# Patient Record
Sex: FEMALE | Race: WHITE | NOT HISPANIC OR LATINO | Employment: PART TIME | URBAN - METROPOLITAN AREA
[De-identification: names, ages, dates, MRNs, and addresses within clinical notes are randomized per-mention and may not be internally consistent; named-entity substitution may affect disease eponyms.]

---

## 2024-07-26 ENCOUNTER — APPOINTMENT (OUTPATIENT)
Dept: OBSTETRICS AND GYNECOLOGY | Facility: CLINIC | Age: 34
End: 2024-07-26
Payer: COMMERCIAL

## 2024-07-26 VITALS — DIASTOLIC BLOOD PRESSURE: 70 MMHG | SYSTOLIC BLOOD PRESSURE: 120 MMHG

## 2024-07-26 DIAGNOSIS — Z32.02 URINE PREGNANCY TEST NEGATIVE: ICD-10-CM

## 2024-07-26 DIAGNOSIS — Z11.3 SCREENING FOR STD (SEXUALLY TRANSMITTED DISEASE): ICD-10-CM

## 2024-07-26 DIAGNOSIS — M62.89 PELVIC FLOOR DYSFUNCTION: ICD-10-CM

## 2024-07-26 DIAGNOSIS — Z30.430 ENCOUNTER FOR IUD INSERTION: Primary | ICD-10-CM

## 2024-07-26 LAB — PREGNANCY TEST URINE, POC: NEGATIVE

## 2024-07-26 PROCEDURE — 1036F TOBACCO NON-USER: CPT | Performed by: NURSE PRACTITIONER

## 2024-07-26 PROCEDURE — 58300 INSERT INTRAUTERINE DEVICE: CPT | Performed by: NURSE PRACTITIONER

## 2024-07-26 PROCEDURE — 87491 CHLMYD TRACH DNA AMP PROBE: CPT

## 2024-07-26 PROCEDURE — 87591 N.GONORRHOEAE DNA AMP PROB: CPT

## 2024-07-26 PROCEDURE — 81025 URINE PREGNANCY TEST: CPT | Performed by: NURSE PRACTITIONER

## 2024-07-26 RX ORDER — LEVONORGESTREL 52 MG/1
1 INTRAUTERINE DEVICE INTRAUTERINE ONCE
COMMUNITY

## 2024-07-26 NOTE — PROGRESS NOTES
Subjective   Patient ID: Kati Jones is a 33 y.o. female who presents for Contraception (Desires IUD-/Reviewing  EMR/Last Annual Exam: 05/04/2023 with Carissa WALDRON CNM/Normal pap 2022 documented on Carissa's office visit note/- patient declined a chaperone-/).  HPI  Here for IUD insertion. Had a Mirena in the past and did well with this. She is s/p vaginal delivery 3 months ago. She had one normal period about 6 weeks postpartum. No periods since then. She is breastfeeding.   Was being seen at [facility] and planned to have her IUD and follow-up with pelvic floor PT, but her insurance changed and she needs to re-establish with . Would like to continue with her plan of IUD and PT. Will place referral order for PT.   Urine HCG is negative today.   Reviewed risks/ benefits of IUD as well as procedure for placement.   Consent signed electronically.     Review of Systems   All other systems reviewed and are negative.      Objective   Physical Exam  Constitutional:       Appearance: Normal appearance.   Pulmonary:      Effort: Pulmonary effort is normal.   Genitourinary:     General: Normal vulva.      Vagina: Normal.      Cervix: Normal.      Uterus: Normal.    Neurological:      Mental Status: She is alert.   Psychiatric:         Mood and Affect: Mood normal.         Behavior: Behavior normal.     Patient ID: Kati Jones is a 33 y.o. female.    IUD Insertion    Performed by: CHRISTINA Bobo  Authorized by: CHRISTINA Bobo    Procedure: IUD insertion    Consent obtained by patient, parent, or legal power of  - including discussion of procedure risks and benefits, patient questions answered, and patient education provided: yes    Pregnancy risk: reasonably certain the patient is not pregnant    Pregnancy risk comment:  Urine HCG negative  Date/Time of Insertion:  7/26/2024 2:37 PM  Immediately prior to procedure a time out was called: yes    Pelvic exam performed: yes     Speculum placed in vagina: yes    Cervix cleaned and prepped: yes    Tenaculum/Allis/Ring Forceps applied to cervix: yes    IUD inserted without complications: yes    OSM: levonorgestrel 21 mcg/24 hr (8 yrs) 52 mg  Strings trimmed to (cm):  3  Patient tolerated procedure well: yes    Estimated blood loss (mL):  0  Intended removal date: 8 years        Assessment/Plan   Diagnoses and all orders for this visit:  Encounter for IUD insertion  IUD inserted per procedure note.   Pelvic floor dysfunction  -     Referral to Physical Therapy; Future  Urine pregnancy test negative  -     POCT pregnancy, urine manually resulted  Screening for STD (sexually transmitted disease)  -     C. Trachomatis / N. Gonorrhoeae, Amplified Detection  Other orders  -     IUD Insertion  Follow-up in 4-6 weeks for string check.       PAULO Bobo-CNP 07/26/24 2:19 PM

## 2024-07-27 LAB
C TRACH RRNA SPEC QL NAA+PROBE: NEGATIVE
N GONORRHOEA DNA SPEC QL PROBE+SIG AMP: NEGATIVE

## 2024-08-19 ENCOUNTER — EVALUATION (OUTPATIENT)
Dept: PHYSICAL THERAPY | Facility: HOSPITAL | Age: 34
End: 2024-08-19
Payer: COMMERCIAL

## 2024-08-19 DIAGNOSIS — M62.89 PELVIC FLOOR DYSFUNCTION: Primary | ICD-10-CM

## 2024-08-19 DIAGNOSIS — M62.81 MUSCLE WEAKNESS: ICD-10-CM

## 2024-08-19 PROCEDURE — 97110 THERAPEUTIC EXERCISES: CPT | Mod: GP | Performed by: PHYSICAL THERAPIST

## 2024-08-19 PROCEDURE — 97535 SELF CARE MNGMENT TRAINING: CPT | Mod: GP | Performed by: PHYSICAL THERAPIST

## 2024-08-19 PROCEDURE — 97162 PT EVAL MOD COMPLEX 30 MIN: CPT | Mod: GP | Performed by: PHYSICAL THERAPIST

## 2024-08-19 ASSESSMENT — ENCOUNTER SYMPTOMS
OCCASIONAL FEELINGS OF UNSTEADINESS: 0
DEPRESSION: 0
LOSS OF SENSATION IN FEET: 0

## 2024-08-19 ASSESSMENT — PAIN SCALES - GENERAL: PAINLEVEL_OUTOF10: 0 - NO PAIN

## 2024-08-19 ASSESSMENT — PATIENT HEALTH QUESTIONNAIRE - PHQ9
SUM OF ALL RESPONSES TO PHQ9 QUESTIONS 1 AND 2: 0
1. LITTLE INTEREST OR PLEASURE IN DOING THINGS: NOT AT ALL
2. FEELING DOWN, DEPRESSED OR HOPELESS: NOT AT ALL

## 2024-08-19 ASSESSMENT — PAIN - FUNCTIONAL ASSESSMENT: PAIN_FUNCTIONAL_ASSESSMENT: 0-10

## 2024-08-19 NOTE — PROGRESS NOTES
" Physical Therapy    PELVIC FLOOR EVALUATION AND TREATMENT    Name: Kati Jones  MRN: [id number]  : 1990  Today's Date: 2024  Visit Number: 1  PT Evaluation Time Entry  PT Evaluation (Moderate) Time Entry: 30  PT Therapeutic Procedures Time Entry  Therapeutic Exercise Time Entry: 15  Self-Care/Home Mgmt Trainin                 Assessment:   PT Assessment  PT Assessment Results: Decreased strength, Decreased endurance, Decreased coordination  Rehab Prognosis: Excellent    Pt presents with symptoms of postpartum SHAZIA, core and pelvic girdle weakness, decreased endurance, PF muscle weakness, possible pelvic organ prolapse and poor bladder habits that are interfering with her ability to perform higher intensity activities at OF.  Recommend skilled pelvic floor physical therapy to address the above deficits that affect functional activities of daily living.   Skilled intervention is needed to train and provide proper technique, educate patient on progression, risks, prognosis, and provide adequate feedback for technique correction and implementation.     Discharge planned upon achievement of goals or reaching maximum benefit from skilled physical therapy services.    Plan:   OP PT Plan  Treatment/Interventions: Biofeedback, Education/ Instruction, Manual therapy, Neuromuscular re-education, Self care/ home management, Therapeutic activities, Therapeutic exercises  PT Plan: Skilled PT  PT Frequency: 1 time per week  Duration: 12 weeks  Number of Treatments Authorized: requires auth  Rehab Potential: Excellent  Plan of Care Agreement: Patient    Interventions: Issued handout the \"Knack\" and HEP    Current Problem:  Problem List Items Addressed This Visit             ICD-10-CM    Pelvic floor dysfunction - Primary M62.89    Muscle weakness M62.81     Subjective   General  Pt reports being post-partum with 2nd child 24 and has had some difficulties with SHAZIA during activity such as exercise and " also difficulty using her menstrual cup due to the currently lower position of her cervix. Pt had an IUD inserted 7/26/24 and has been intermittently spotting since then.  Precautions  Precautions  STEADI Fall Risk Score (The score of 4 or more indicates an increased risk of falling): 0  Precautions Comment: currently breastfeeding, otherwise none        Pain  Pain Assessment: 0-10  0-10 (Numeric) Pain Score: 0 - No pain    Objective   PELVIC HISTORY:  Chief Complaint/Description of Symptoms: Urinary leakage with increased activities, such as riding horses or picking up her daughter. Pt wanted PF PT after having the baby in April, but was changing insurances and had difficulty getting the prescription. Waited until getting her IUD placed to request PF PT again. Pt currently breastfeeding as well. Also reports being unable to use her menstrual cup per usual as she feels her cervix has prolapsed and is in the way of being able to insert the cup as far up as needed to prevent it from falling out. States that with past use she has not felt it touching her cervix prior to this second baby.  HPI: None sig noted.  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 No   Current exercise regime: Riding horses, riding the [place] bike    BLADDER:  Bladder  Excessive urinary urgency: occasionally  Daytime voiding frequency : every 2-3 hours  Nighttime voiding frequency: 0  Unintentional urine loss: 2x/week  Leakage occurs with: exercise, Other: (comment), sneeze, cough, lifting (riding horses)  Leakage amount: small  Protection: liners  Daily fluid intake: 120 oz  Daily caffeine intake: 8-20 oz coffee  Difficulty initiating  urine flow: not at all  Slow/Weak urine stream: not at all  Able to void completely: Yes  Frequent UTI's: No  Diagnostic tests: No    BOWEL:  Bowel  Unintentional stool loss: never  Bowel movement frequency: once a day  Frequent diarrhea: No  Constipation/straining,incomplete bowel movement: No  Taking stool softeners or fiber supplements: No  Excessive Bowel Urgency: No    EXTERNAL PALPATION:  Iliopsoas: (+) Tightness   Adductor: (+) Tightness   Abdominals: (-) Pain/Tightness       POSTURE: WFL      MMT R/L:  Hip flex: 4+/5 Leonard  Hip ER: 4,4+/5 Leonard  Hip IR: 4/5 Leonard  Hip add: 5/5 Leonard  Hip abd: 4/5 Leonard  Hip ext: 4,4+/5  TA: 3+,4-/5  (no diastasis recti noted)    ROM R/L:  Lumbar flex: WNL  Lumbar ext: WFL  Hip flex: WNL  Hip IR: WFL  Hip ER: WNL    FLEXIBILITY R/L:  Hamstrings: mild tightness Leonard  Psoas: mild-mod tightness Leonard    OUTCOMES MEASURE:  Female NIH-CPSI: 6 (Pain 0, Urinary 2, QOL 4)    Education:   Outpatient Education  Individual(s) Educated: Patient  Education Provided: Body Mechanics, Anatomy, Home Exercise Program, Physiology, POC  Risk and Benefits Discussed with Patient/Caregiver/Other: yes  Patient/Caregiver Demonstrated Understanding: yes  Plan of Care Discussed and Agreed Upon: yes  Patient Response to Education: Patient/Caregiver Verbalized Understanding of Information, Patient/Caregiver Performed Return Demonstration of Exercises/Activities    HEP issued: Access Code [email or web address]  Careplan Goals:  Active       SHAZIA        1) Pt to be Indep with a home exercise program with >80% patient adherence       Start:  08/19/24    Expected End:  11/11/24            2) Pt to report 50% reduction in incontinence episodes to indicate increased bladder control       Start:  08/19/24    Expected End:  11/11/24            3) Improve MMT of lateral pelvic girdle to 5/5 and core to >/=4/5  to improve pelvic/trunk stability with more intense activity such as horseback riding       Start:  08/19/24    Expected End:   11/11/24            4) Functional Outcome NIH CPSI score improves by >50% raw score to indicate improvement in subscales       Start:  08/19/24    Expected End:  11/11/24            5) Improve PF muscle strength to enable pt to use menstrual cup with ease       Start:  08/19/24    Expected End:  11/11/24                  Jyoti Kirkpatrick, PT

## 2024-09-05 ENCOUNTER — APPOINTMENT (OUTPATIENT)
Dept: OBSTETRICS AND GYNECOLOGY | Facility: CLINIC | Age: 34
End: 2024-09-05
Payer: COMMERCIAL

## 2024-09-05 VITALS — DIASTOLIC BLOOD PRESSURE: 70 MMHG | SYSTOLIC BLOOD PRESSURE: 110 MMHG

## 2024-09-05 DIAGNOSIS — Z30.431 IUD CHECK UP: Primary | ICD-10-CM

## 2024-09-05 DIAGNOSIS — Z11.51 SCREENING FOR HPV (HUMAN PAPILLOMAVIRUS): ICD-10-CM

## 2024-09-05 DIAGNOSIS — Z12.4 SCREENING FOR CERVICAL CANCER: ICD-10-CM

## 2024-09-05 PROCEDURE — 87624 HPV HI-RISK TYP POOLED RSLT: CPT

## 2024-09-05 PROCEDURE — 99213 OFFICE O/P EST LOW 20 MIN: CPT | Performed by: NURSE PRACTITIONER

## 2024-09-05 ASSESSMENT — DERMATOLOGY QUALITY OF LIFE (QOL) ASSESSMENT
RATE HOW EMOTIONALLY BOTHERED YOU ARE BY YOUR SKIN PROBLEM (FOR EXAMPLE, WORRY, EMBARRASSMENT, FRUSTRATION): 0 - NEVER BOTHERED
RATE HOW BOTHERED YOU ARE BY SYMPTOMS OF YOUR SKIN PROBLEM (EG, ITCHING, STINGING BURNING, HURTING OR SKIN IRRITATION): 0 - NEVER BOTHERED
RATE HOW EMOTIONALLY BOTHERED YOU ARE BY YOUR SKIN PROBLEM (FOR EXAMPLE, WORRY, EMBARRASSMENT, FRUSTRATION): 0 - NEVER BOTHERED
DATE THE QUALITY-OF-LIFE ASSESSMENT WAS COMPLETED: 67088
RATE HOW BOTHERED YOU ARE BY SYMPTOMS OF YOUR SKIN PROBLEM (EG, ITCHING, STINGING BURNING, HURTING OR SKIN IRRITATION): 0 - NEVER BOTHERED
RATE HOW BOTHERED YOU ARE BY EFFECTS OF YOUR SKIN PROBLEMS ON YOUR ACTIVITIES (EG, GOING OUT, ACCOMPLISHING WHAT YOU WANT, WORK ACTIVITIES OR YOUR RELATIONSHIPS WITH OTHERS): 0 - NEVER BOTHERED
RATE HOW BOTHERED YOU ARE BY EFFECTS OF YOUR SKIN PROBLEMS ON YOUR ACTIVITIES (EG, GOING OUT, ACCOMPLISHING WHAT YOU WANT, WORK ACTIVITIES OR YOUR RELATIONSHIPS WITH OTHERS): 0 - NEVER BOTHERED

## 2024-09-05 ASSESSMENT — PATIENT GLOBAL ASSESSMENT (PGA): WHAT IS THE PGA: PATIENT GLOBAL ASSESSMENT:  1 - CLEAR

## 2024-09-05 NOTE — PROGRESS NOTES
Subjective   Patient ID: Kati Jones is a 33 y.o. female who presents for Follow-up (IUD- Mirena inserted 07/26/2024/Reviewing  EMR/Last Annual Exam: 05/04/2023 with Carissa WALDRON CNM/Negative Pap 2022 documented in Carissa note./- patient declined a chaperone-/).  HPI  Here for follow-up after having her Mirena IUD placed. She is doing well. Denies any pain or pain with intercourse. She is having some continued spotting since having it placed. Spotting is daily. She wears a panty liner daily. She sometimes goes through one panty liner per day, other days, she is changing panty liners multiple times.   Overall, she is happy with her IUD and would like to maintain it and monitor bleeding moving forward.   Due for PAP.   Review of Systems   All other systems reviewed and are negative.      Objective   Physical Exam  Constitutional:       Appearance: Normal appearance.   Cardiovascular:      Pulses: Normal pulses.      Heart sounds: Normal heart sounds.   Pulmonary:      Effort: Pulmonary effort is normal.      Breath sounds: Normal breath sounds.   Genitourinary:     General: Normal vulva.      Vagina: Normal.      Cervix: Normal.      Uterus: Normal.       Adnexa: Right adnexa normal and left adnexa normal.      Comments: + IUD strings noted at cervical os.   Neurological:      Mental Status: She is alert.   Psychiatric:         Mood and Affect: Mood normal.         Behavior: Behavior normal.         Assessment/Plan   Diagnoses and all orders for this visit:  IUD check up  Maintain IUD. Monitor bleeding.   Screening for cervical cancer  -     THINPREP PAP  Screening for HPV (human papillomavirus)  -     THINPREP PAP  Follow-up in the office if bleeding persist. Follow-up for well woman exam when due.        CHRISTINA Bobo 09/05/24 1:22 PM

## 2024-09-06 ENCOUNTER — OFFICE VISIT (OUTPATIENT)
Dept: DERMATOLOGY | Facility: CLINIC | Age: 34
End: 2024-09-06
Payer: COMMERCIAL

## 2024-09-06 DIAGNOSIS — D18.01 HEMANGIOMA OF SKIN: ICD-10-CM

## 2024-09-06 DIAGNOSIS — L82.1 SEBORRHEIC KERATOSIS: ICD-10-CM

## 2024-09-06 DIAGNOSIS — D22.9 MULTIPLE BENIGN NEVI: ICD-10-CM

## 2024-09-06 DIAGNOSIS — L81.4 LENTIGO: ICD-10-CM

## 2024-09-06 DIAGNOSIS — D23.9 DERMATOFIBROMA: Primary | ICD-10-CM

## 2024-09-06 PROCEDURE — 1036F TOBACCO NON-USER: CPT | Performed by: DERMATOLOGY

## 2024-09-06 PROCEDURE — 99203 OFFICE O/P NEW LOW 30 MIN: CPT | Performed by: DERMATOLOGY

## 2024-09-06 NOTE — PROGRESS NOTES
Subjective     Kati Jones is a 33 y.o. female who presents for the following: Skin Check (Pt presents to office for full skin exam.  Last full skin exam never.  Pt has no history of NMSC. Pt has family history of NMSC in grandfather.  Pt has scattered lesions of concern at this time./).     Review of Systems:  No other skin or systemic complaints other than what is documented elsewhere in the note.    The following portions of the chart were reviewed this encounter and updated as appropriate:  Tobacco  Allergies  Meds  Problems  Med Hx  Surg Hx  Fam Hx         Skin Cancer History  No skin cancer on file.      Specialty Problems    None       Objective     Well appearing patient in no apparent distress; mood and affect are within normal limits.    A full examination was performed including scalp, face, neck, chest, abdomen, back, bilateral upper extremities, bilateral lower extremities. Patient declines exam underneath the underwear; patient declines exam of the lower abdomen/mons pubis, buttocks, groin, genitalia, perineal and perianal skin and these areas were not examined.    All findings within normal limits unless otherwise noted below.    Assessment/Plan   1. Dermatofibroma (2)  Left Knee - Anterior, Left Thigh - Anterior  Firm hyperpigmented papule with positive dimple sign. On dermoscopy, there is a central scar-like area with a uniform peripheral pigment network.    (lesions of patient's concern)  -Discussed nature of condition  -Reassurance, recommend observation  -Recommend that the patient avoid trauma/injury/manipulation to the area to avoid the lesion enlarging  -Recommend the patient return for re-evaluation if the lesion enlarges, becomes painful or symptomatic, or is changing    2. Multiple benign nevi  Brown and tan macules and papules with reassuring findings on dermoscopy    -These lesions have benign, reassuring patterns on dermoscopy  -Recommend continued self observation, and to  contact the office if any changes in nevi are noticed    3. Lentigo  Tan macules    -Benign appearing on exam  -Reassurance, recommend observation    4. Seborrheic keratosis  Stuck on, waxy macule(s)/papule(s)/plaque(s) with comedo-like openings and milia like cysts    -Discussed the nature of the diagnosis  -Reassurance, recommend continued observation    5. Hemangioma of skin  Cherry red papules    -Discussed the nature of the diagnosis  -Reassurance, recommend continued observation        Discussed/information given on safe sun practices and use of sunscreen, sun protective clothing or sun avoidance. Recommend to use over the counter medication of sunscreen with a SPF 30 or higher on a daily basis prior to sun exposure to reduce the risk of skin cancer.    Follow up in 1-2 years for FSE  Discussed if there are any changes or development of concerning symptoms (lesion/skin condition is changing, bleeding, enlarging, or worsening) the patient is to contact my office. The patient verbalizes understanding.     Jacquie Vilchis MD  9/6/2024

## 2024-09-19 LAB
CYTOLOGY CMNT CVX/VAG CYTO-IMP: NORMAL
HPV HR 12 DNA GENITAL QL NAA+PROBE: NEGATIVE
HPV HR GENOTYPES PNL CVX NAA+PROBE: NEGATIVE
HPV16 DNA SPEC QL NAA+PROBE: NEGATIVE
HPV18 DNA SPEC QL NAA+PROBE: NEGATIVE
LAB AP CONTRACEPTIVE HISTORY: NORMAL
LAB AP HPV GENOTYPE QUESTION: YES
LAB AP HPV HR: NORMAL
LABORATORY COMMENT REPORT: NORMAL
PATH REPORT.TOTAL CANCER: NORMAL

## 2024-10-17 ENCOUNTER — APPOINTMENT (OUTPATIENT)
Dept: DERMATOLOGY | Facility: CLINIC | Age: 34
End: 2024-10-17
Payer: COMMERCIAL

## 2025-07-05 ENCOUNTER — OFFICE VISIT (OUTPATIENT)
Dept: URGENT CARE | Age: 35
End: 2025-07-05
Payer: COMMERCIAL

## 2025-07-05 VITALS
TEMPERATURE: 97.7 F | RESPIRATION RATE: 18 BRPM | OXYGEN SATURATION: 99 % | WEIGHT: 210 LBS | HEIGHT: 72 IN | HEART RATE: 81 BPM | DIASTOLIC BLOOD PRESSURE: 83 MMHG | SYSTOLIC BLOOD PRESSURE: 134 MMHG | BODY MASS INDEX: 28.44 KG/M2

## 2025-07-05 DIAGNOSIS — H61.21 IMPACTED CERUMEN OF RIGHT EAR: Primary | ICD-10-CM

## 2025-07-05 PROBLEM — O09.529 ANTEPARTUM MULTIGRAVIDA OF ADVANCED MATERNAL AGE (HHS-HCC): Status: ACTIVE | Noted: 2023-09-18

## 2025-07-05 ASSESSMENT — PATIENT HEALTH QUESTIONNAIRE - PHQ9
2. FEELING DOWN, DEPRESSED OR HOPELESS: NOT AT ALL
1. LITTLE INTEREST OR PLEASURE IN DOING THINGS: NOT AT ALL
SUM OF ALL RESPONSES TO PHQ9 QUESTIONS 1 AND 2: 0

## 2025-07-05 ASSESSMENT — ENCOUNTER SYMPTOMS
LOSS OF SENSATION IN FEET: 0
DEPRESSION: 0
OCCASIONAL FEELINGS OF UNSTEADINESS: 0
CONSTITUTIONAL NEGATIVE: 1

## 2025-07-05 NOTE — PROGRESS NOTES
"Subjective   Patient ID: Kati Jones is a 34 y.o. female. They present today with a chief complaint of No chief complaint on file..    History of Present Illness  34-year-old female presents clinic today with complaints of ear clogging on the right side.  She states been ongoing for about a week.  She states she has a history of wax buildup.  Denies pain.  Denies drainage from the ear.  She has tried over-the-counter Debrox.          Past Medical History  Allergies as of 07/05/2025    (No Known Allergies)       Prescriptions Prior to Admission[1]     Medical History[2]    Surgical History[3]     reports that she has never smoked. She has never used smokeless tobacco. She reports current alcohol use of about 5.0 standard drinks of alcohol per week. She reports that she does not use drugs.    Review of Systems  Review of Systems   Constitutional: Negative.    HENT:  Negative for ear discharge and ear pain.                                   Objective    Vitals:    07/05/25 0936   BP: 134/83   Pulse: 81   Resp: 18   Temp: 36.5 °C (97.7 °F)   TempSrc: Oral   SpO2: 99%   Weight: 95.3 kg (210 lb)   Height: 1.88 m (6' 2\")     No LMP recorded (lmp unknown).    Physical Exam  Constitutional:       Appearance: Normal appearance.   HENT:      Right Ear: Tympanic membrane normal. There is impacted cerumen.      Left Ear: Tympanic membrane and ear canal normal.   Neurological:      Mental Status: She is alert.         Ear Cerumen Removal    Date/Time: 7/5/2025 9:55 AM    Performed by: Babak Brody PA-C  Authorized by: Babak Brody PA-C    Consent:     Consent obtained:  Verbal    Consent given by:  Patient    Risks, benefits, and alternatives were discussed: yes      Risks discussed:  Bleeding, infection, TM perforation, pain, incomplete removal and dizziness    Alternatives discussed:  No treatment  Universal protocol:     Patient identity confirmed:  Verbally with patient  Procedure details:     Location:  R ear    " Procedure type: irrigation      Procedure outcomes: cerumen removed    Post-procedure details:     Inspection:  No bleeding and TM intact    Hearing quality:  Improved    Procedure completion:  Tolerated      Point of Care Test & Imaging Results from this visit  No results found for this visit on 07/05/25.   Imaging  No results found.    Cardiology, Vascular, and Other Imaging  No other imaging results found for the past 2 days      Diagnostic study results (if any) were reviewed by Babak Brody PA-C.    Assessment/Plan   Allergies, medications, history, and pertinent labs/EKGs/Imaging reviewed by Babak Brody PA-C.     Medical Decision Making  Patient pleasant cooperative on examination.    On examination there is no noted cerumen impaction to the right canal.  This was removed in office without difficulty.  No acute abnormality noted to the TM.    Patient states symptoms improved.    Patient alert and oriented, no acute distress upon discharge.    Orders and Diagnoses  There are no diagnoses linked to this encounter.    Medical Admin Record      Patient disposition: Home    Electronically signed by Babak Brody PA-C  9:43 AM           [1] (Not in a hospital admission)  [2] History reviewed. No pertinent past medical history.  [3]   Past Surgical History:  Procedure Laterality Date    WISDOM TOOTH EXTRACTION

## 2025-09-11 ENCOUNTER — APPOINTMENT (OUTPATIENT)
Dept: OBSTETRICS AND GYNECOLOGY | Facility: CLINIC | Age: 35
End: 2025-09-11
Payer: COMMERCIAL